# Patient Record
Sex: FEMALE | Race: BLACK OR AFRICAN AMERICAN | NOT HISPANIC OR LATINO | Employment: FULL TIME | URBAN - METROPOLITAN AREA
[De-identification: names, ages, dates, MRNs, and addresses within clinical notes are randomized per-mention and may not be internally consistent; named-entity substitution may affect disease eponyms.]

---

## 2021-06-16 PROBLEM — I10 BENIGN ESSENTIAL HYPERTENSION: Status: ACTIVE | Noted: 2019-04-12

## 2021-06-16 PROBLEM — D25.1 INTRAMURAL AND SUBSEROUS LEIOMYOMA OF UTERUS: Status: ACTIVE | Noted: 2018-05-22

## 2021-06-16 PROBLEM — D25.2 INTRAMURAL AND SUBSEROUS LEIOMYOMA OF UTERUS: Status: ACTIVE | Noted: 2018-05-22

## 2021-06-16 PROBLEM — R73.03 PREDIABETES: Status: ACTIVE | Noted: 2019-06-07

## 2021-06-16 PROBLEM — J06.9 VIRAL UPPER RESPIRATORY TRACT INFECTION: Status: ACTIVE | Noted: 2019-04-12

## 2021-06-16 PROBLEM — E87.6 LOW SERUM POTASSIUM: Status: ACTIVE | Noted: 2018-06-04

## 2021-06-16 PROBLEM — E23.6 RATHKE'S CYST (H): Status: ACTIVE | Noted: 2017-09-29

## 2022-09-08 ENCOUNTER — HOSPITAL ENCOUNTER (EMERGENCY)
Facility: HOSPITAL | Age: 47
Discharge: HOME | End: 2022-09-08
Attending: EMERGENCY MEDICINE | Admitting: EMERGENCY MEDICINE
Payer: COMMERCIAL

## 2022-09-08 ENCOUNTER — APPOINTMENT (OUTPATIENT)
Dept: RADIOLOGY | Facility: HOSPITAL | Age: 47
End: 2022-09-08
Attending: EMERGENCY MEDICINE
Payer: COMMERCIAL

## 2022-09-08 ENCOUNTER — APPOINTMENT (OUTPATIENT)
Dept: CT IMAGING | Facility: HOSPITAL | Age: 47
End: 2022-09-08
Attending: EMERGENCY MEDICINE
Payer: COMMERCIAL

## 2022-09-08 VITALS
HEIGHT: 67 IN | DIASTOLIC BLOOD PRESSURE: 64 MMHG | WEIGHT: 263.5 LBS | OXYGEN SATURATION: 99 % | RESPIRATION RATE: 16 BRPM | TEMPERATURE: 99.2 F | BODY MASS INDEX: 41.36 KG/M2 | HEART RATE: 80 BPM | SYSTOLIC BLOOD PRESSURE: 105 MMHG

## 2022-09-08 DIAGNOSIS — R05.9 COUGH: ICD-10-CM

## 2022-09-08 DIAGNOSIS — R68.83 CHILLS: ICD-10-CM

## 2022-09-08 LAB
ALBUMIN SERPL-MCNC: 3.6 G/DL (ref 3.5–5)
ALBUMIN UR-MCNC: 30 MG/DL
ALP SERPL-CCNC: 73 U/L (ref 45–120)
ALT SERPL W P-5'-P-CCNC: 19 U/L (ref 0–45)
ANION GAP SERPL CALCULATED.3IONS-SCNC: 11 MMOL/L (ref 5–18)
APPEARANCE UR: ABNORMAL
AST SERPL W P-5'-P-CCNC: 24 U/L (ref 0–40)
BASOPHILS # BLD AUTO: 0 10E3/UL (ref 0–0.2)
BASOPHILS NFR BLD AUTO: 0 %
BILIRUB DIRECT SERPL-MCNC: 0.2 MG/DL
BILIRUB SERPL-MCNC: 0.7 MG/DL (ref 0–1)
BILIRUB UR QL STRIP: NEGATIVE
BUN SERPL-MCNC: 17 MG/DL (ref 8–22)
CALCIUM SERPL-MCNC: 9.4 MG/DL (ref 8.5–10.5)
CHLORIDE BLD-SCNC: 96 MMOL/L (ref 98–107)
CO2 SERPL-SCNC: 31 MMOL/L (ref 22–31)
COLOR UR AUTO: YELLOW
CREAT SERPL-MCNC: 1.1 MG/DL (ref 0.6–1.1)
EOSINOPHIL # BLD AUTO: 0.1 10E3/UL (ref 0–0.7)
EOSINOPHIL NFR BLD AUTO: 1 %
ERYTHROCYTE [DISTWIDTH] IN BLOOD BY AUTOMATED COUNT: 12.8 % (ref 10–15)
FLUAV RNA SPEC QL NAA+PROBE: NEGATIVE
FLUBV RNA RESP QL NAA+PROBE: NEGATIVE
GFR SERPL CREATININE-BSD FRML MDRD: 61 ML/MIN/1.73M2
GLUCOSE BLD-MCNC: 207 MG/DL (ref 70–125)
GLUCOSE UR STRIP-MCNC: NEGATIVE MG/DL
HCT VFR BLD AUTO: 42.2 % (ref 35–47)
HGB BLD-MCNC: 14.1 G/DL (ref 11.7–15.7)
HGB UR QL STRIP: NEGATIVE
HYALINE CASTS: 1 /LPF
IMM GRANULOCYTES # BLD: 0.1 10E3/UL
IMM GRANULOCYTES NFR BLD: 1 %
KETONES UR STRIP-MCNC: ABNORMAL MG/DL
LEUKOCYTE ESTERASE UR QL STRIP: NEGATIVE
LYMPHOCYTES # BLD AUTO: 0.8 10E3/UL (ref 0.8–5.3)
LYMPHOCYTES NFR BLD AUTO: 5 %
MCH RBC QN AUTO: 30.6 PG (ref 26.5–33)
MCHC RBC AUTO-ENTMCNC: 33.4 G/DL (ref 31.5–36.5)
MCV RBC AUTO: 92 FL (ref 78–100)
MONOCYTES # BLD AUTO: 0.5 10E3/UL (ref 0–1.3)
MONOCYTES NFR BLD AUTO: 3 %
MUCOUS THREADS #/AREA URNS LPF: PRESENT /LPF
NEUTROPHILS # BLD AUTO: 15.5 10E3/UL (ref 1.6–8.3)
NEUTROPHILS NFR BLD AUTO: 90 %
NITRATE UR QL: NEGATIVE
NRBC # BLD AUTO: 0 10E3/UL
NRBC BLD AUTO-RTO: 0 /100
PH UR STRIP: 6 [PH] (ref 5–7)
PLATELET # BLD AUTO: 353 10E3/UL (ref 150–450)
POTASSIUM BLD-SCNC: 3.1 MMOL/L (ref 3.5–5)
PROT SERPL-MCNC: 7.3 G/DL (ref 6–8)
RBC # BLD AUTO: 4.61 10E6/UL (ref 3.8–5.2)
RBC URINE: 0 /HPF
RSV RNA SPEC NAA+PROBE: NEGATIVE
SARS-COV-2 RNA RESP QL NAA+PROBE: NEGATIVE
SODIUM SERPL-SCNC: 138 MMOL/L (ref 136–145)
SP GR UR STRIP: 1.04 (ref 1–1.03)
SQUAMOUS EPITHELIAL: 9 /HPF
UROBILINOGEN UR STRIP-MCNC: 2 MG/DL
WBC # BLD AUTO: 16.9 10E3/UL (ref 4–11)
WBC URINE: 2 /HPF

## 2022-09-08 PROCEDURE — 82248 BILIRUBIN DIRECT: CPT | Performed by: EMERGENCY MEDICINE

## 2022-09-08 PROCEDURE — 80053 COMPREHEN METABOLIC PANEL: CPT | Performed by: STUDENT IN AN ORGANIZED HEALTH CARE EDUCATION/TRAINING PROGRAM

## 2022-09-08 PROCEDURE — 87637 SARSCOV2&INF A&B&RSV AMP PRB: CPT | Performed by: EMERGENCY MEDICINE

## 2022-09-08 PROCEDURE — 258N000003 HC RX IP 258 OP 636: Performed by: STUDENT IN AN ORGANIZED HEALTH CARE EDUCATION/TRAINING PROGRAM

## 2022-09-08 PROCEDURE — C9803 HOPD COVID-19 SPEC COLLECT: HCPCS

## 2022-09-08 PROCEDURE — 250N000011 HC RX IP 250 OP 636: Performed by: EMERGENCY MEDICINE

## 2022-09-08 PROCEDURE — 74177 CT ABD & PELVIS W/CONTRAST: CPT

## 2022-09-08 PROCEDURE — 81001 URINALYSIS AUTO W/SCOPE: CPT | Performed by: EMERGENCY MEDICINE

## 2022-09-08 PROCEDURE — 250N000013 HC RX MED GY IP 250 OP 250 PS 637: Performed by: EMERGENCY MEDICINE

## 2022-09-08 PROCEDURE — 81001 URINALYSIS AUTO W/SCOPE: CPT | Performed by: STUDENT IN AN ORGANIZED HEALTH CARE EDUCATION/TRAINING PROGRAM

## 2022-09-08 PROCEDURE — 80048 BASIC METABOLIC PNL TOTAL CA: CPT | Performed by: EMERGENCY MEDICINE

## 2022-09-08 PROCEDURE — 87637 SARSCOV2&INF A&B&RSV AMP PRB: CPT | Performed by: STUDENT IN AN ORGANIZED HEALTH CARE EDUCATION/TRAINING PROGRAM

## 2022-09-08 PROCEDURE — 85025 COMPLETE CBC W/AUTO DIFF WBC: CPT | Performed by: EMERGENCY MEDICINE

## 2022-09-08 PROCEDURE — 99285 EMERGENCY DEPT VISIT HI MDM: CPT | Mod: CS,25

## 2022-09-08 PROCEDURE — 71045 X-RAY EXAM CHEST 1 VIEW: CPT

## 2022-09-08 PROCEDURE — 36415 COLL VENOUS BLD VENIPUNCTURE: CPT | Performed by: STUDENT IN AN ORGANIZED HEALTH CARE EDUCATION/TRAINING PROGRAM

## 2022-09-08 PROCEDURE — 85025 COMPLETE CBC W/AUTO DIFF WBC: CPT | Performed by: STUDENT IN AN ORGANIZED HEALTH CARE EDUCATION/TRAINING PROGRAM

## 2022-09-08 RX ORDER — POTASSIUM CHLORIDE 1500 MG/1
40 TABLET, EXTENDED RELEASE ORAL ONCE
Status: COMPLETED | OUTPATIENT
Start: 2022-09-08 | End: 2022-09-08

## 2022-09-08 RX ORDER — ACETAMINOPHEN 325 MG/1
975 TABLET ORAL ONCE
Status: COMPLETED | OUTPATIENT
Start: 2022-09-08 | End: 2022-09-08

## 2022-09-08 RX ORDER — IOPAMIDOL 755 MG/ML
100 INJECTION, SOLUTION INTRAVASCULAR ONCE
Status: COMPLETED | OUTPATIENT
Start: 2022-09-08 | End: 2022-09-08

## 2022-09-08 RX ADMIN — POTASSIUM CHLORIDE 40 MEQ: 20 TABLET, EXTENDED RELEASE ORAL at 21:33

## 2022-09-08 RX ADMIN — ACETAMINOPHEN 975 MG: 325 TABLET ORAL at 21:32

## 2022-09-08 RX ADMIN — IOPAMIDOL 100 ML: 755 INJECTION, SOLUTION INTRAVENOUS at 21:47

## 2022-09-08 RX ADMIN — SODIUM CHLORIDE 1000 ML: 9 INJECTION, SOLUTION INTRAVENOUS at 17:54

## 2022-09-08 ASSESSMENT — ENCOUNTER SYMPTOMS
CONFUSION: 0
FEVER: 0
CHILLS: 1
DIZZINESS: 0
VOMITING: 0
NAUSEA: 0
SORE THROAT: 0
JOINT SWELLING: 0
SHORTNESS OF BREATH: 0
DYSURIA: 0
ABDOMINAL PAIN: 1
COUGH: 1
HEMATURIA: 0
DIARRHEA: 0

## 2022-09-08 ASSESSMENT — ACTIVITIES OF DAILY LIVING (ADL): ADLS_ACUITY_SCORE: 33

## 2022-09-08 NOTE — Clinical Note
Bjorn Doshi was seen and treated in our emergency department on 9/8/2022.  She may return to work on 09/11/2022.       If you have any questions or concerns, please don't hesitate to call.      José Luis Dykes MD

## 2022-09-08 NOTE — ED TRIAGE NOTES
Patient comes from home with spouse. After a training today, felt cold and had shivering in a addition to kasia abdominal pain. Patient has expressed flu like symptoms and was in bed before coming in.      Triage Assessment     Row Name 09/08/22 3208       Triage Assessment (Adult)    Airway WDL WDL

## 2022-09-09 NOTE — ED NOTES
"Assisting primary RN...    Pt endorsing the IV fluids helped make her feel better. Pain currently 7/10 medial below umbilicus.     Pt updated on lab work and plan for CT scan. Pt questioned about HIV and relation of HIV to her lab work. Pt otherwise endorses she does not have concerns regarding HIV, was just asking.    Also endorsing \"I was supposed to get gallstones removed approx.12 years ago\" was able to control w/ diet.    Also endorsing has fibroids hx, and that she was throwing up all last week and did not eat much and fatigue.  "

## 2022-09-09 NOTE — ED PROVIDER NOTES
"  Emergency Department Encounter     Evaluation Date & Time:   9/8/2022  8:55 PM    CHIEF COMPLAINT:  Abdominal Pain and Flu Symptoms      Triage Note:  Patient comes from home with spouse. After a training today, felt cold and had shivering in a addition to some abdominal pain. Patient has expressed flu like symptoms and was in bed before coming in.              ED COURSE & MEDICAL DECISION MAKING:     ED Course as of 09/08/22 2232   Thu Sep 08, 2022 2110 K 3.1, glucose 207.  WBC 16.9.  Rest of labs unremarkable including neg flu/covid.   2151 CXR (independent interpretation): no acute cardiopulmonary process    2206 CT showing fibroids, which pt knows about.  Cholelithiasis reported, but pt has no RUQ/epigastric pain to suggest acute hepatobiliary process.   2221 Pt re-evaluated, updated.  Will discharge. Encouraged outpatient primary clinic follow up, return precautions.       Pt here with a couple hours of shivering while at work tonight.  Pt reports some lower abdominal pain, primarily with coughing.  Pt reports a \"cold\" last week, but has a continued, mild cough.  Denies n/v/d or other acute symptoms.  She has a temp of 100.4 on initial triage vitals.  Abdomen is rather benign, but given leukocytosis and fever/chills, will get CT abd/pelvis, as well as CXR to rule out PNA.      At the conclusion of the encounter I discussed the results of all the tests and the disposition. The questions were answered. The patient or family acknowledged understanding and was agreeable with the care plan.      MEDICATIONS GIVEN IN THE EMERGENCY DEPARTMENT:  Medications   0.9% sodium chloride BOLUS (0 mLs Intravenous Stopped 9/8/22 1953)   acetaminophen (TYLENOL) tablet 975 mg (975 mg Oral Given 9/8/22 2132)   potassium chloride ER (KLOR-CON M) CR tablet 40 mEq (40 mEq Oral Given 9/8/22 2133)   iopamidol (ISOVUE-370) solution 100 mL (100 mLs Intravenous Given 9/8/22 2147)       NEW PRESCRIPTIONS STARTED AT TODAY'S ED " "VISIT:  New Prescriptions    No medications on file       HPI   The patient was at work earlier today when she felt \"shivery\" and had chills. She went home and rested under blankets until her chills went away. She also complains of a cough and lower abdominal pain when she coughs, she notes that IV pain meds helped her pain. Her first temperature was 100.4, but has gone down to 99.2 while in the ED. The patient has not taken any medication for her symptoms. Patient denies nausea, vomiting, urinary changes, diarrhea, bowel changes, rash, or any other symptoms or complaints.     The history is provided by the patient. No  was used.        Bjorn Doshi is a 47 year old female with a pertinent history of morbid obesity, acute kidney injury, HTN, and tobacco abuse who presents to this ED via walk-in for evaluation of flu symptoms     REVIEW OF SYSTEMS:  Review of Systems   Constitutional: Positive for chills. Negative for fever.   HENT: Negative for sore throat.    Eyes: Negative for visual disturbance.   Respiratory: Positive for cough. Negative for shortness of breath.    Cardiovascular: Negative for chest pain.   Gastrointestinal: Positive for abdominal pain (lower ). Negative for diarrhea, nausea and vomiting.   Endocrine: Negative for polyuria.   Genitourinary: Negative for dysuria and hematuria.        - urinary changes     Musculoskeletal: Negative for joint swelling.   Skin: Negative for rash.   Neurological: Negative for dizziness.   Psychiatric/Behavioral: Negative for confusion.   All other systems reviewed and are negative.          Medical History     Past Medical History:   Diagnosis Date     Chest pain      FHx: heart disease      Gallstones      Gastric ulcer      GDM (gestational diabetes mellitus) 6/10/2010     GERD (gastroesophageal reflux disease)      Hypertension      Pneumonia      Tobacco abuse      UTI (lower urinary tract infection)        No past surgical history on " "file.    Family History   Problem Relation Age of Onset     Heart Disease Mother      Heart Disease Father        Social History     Tobacco Use     Smoking status: Current Some Day Smoker     Packs/day: 0.25   Substance Use Topics     Alcohol use: Yes     Comment: Alcoholic Drinks/day: heavy on weekend only- 12 beers/week     Drug use: No       amLODIPine (NORVASC) 2.5 MG tablet  lisinopril (PRINIVIL,ZESTRIL) 5 MG tablet        Physical Exam     Vitals:  /71   Pulse 105   Temp 99.2  F (37.3  C) (Oral)   Resp 16   Ht 1.702 m (5' 7\")   Wt 119.5 kg (263 lb 8 oz)   SpO2 95%   BMI 41.27 kg/m      PHYSICAL EXAM:   Physical Exam  Vitals and nursing note reviewed.   Constitutional:       General: She is not in acute distress.     Appearance: Normal appearance.   HENT:      Head: Normocephalic and atraumatic.      Nose: Nose normal.      Mouth/Throat:      Mouth: Mucous membranes are moist.   Eyes:      Pupils: Pupils are equal, round, and reactive to light.   Cardiovascular:      Rate and Rhythm: Normal rate and regular rhythm.      Pulses: Normal pulses.           Radial pulses are 2+ on the right side and 2+ on the left side.        Dorsalis pedis pulses are 2+ on the right side and 2+ on the left side.   Pulmonary:      Effort: Pulmonary effort is normal. No respiratory distress.      Breath sounds: Normal breath sounds.   Abdominal:      Palpations: Abdomen is soft.      Tenderness: There is abdominal tenderness. Negative signs include Gibson's sign, Rovsing's sign and McBurney's sign.      Comments: Minimal low abdominal tenderness   No CVA tenderness    Musculoskeletal:      Cervical back: Full passive range of motion without pain and neck supple.      Comments: No calf tenderness or swelling b/l   Skin:     General: Skin is warm.      Findings: No rash.   Neurological:      General: No focal deficit present.      Mental Status: She is alert. Mental status is at baseline.      Comments: Fluent speech, no " acute lateralizing deficits   Psychiatric:         Mood and Affect: Mood normal.         Behavior: Behavior normal.         Results     LAB:  All pertinent labs reviewed and interpreted  Labs Ordered and Resulted from Time of ED Arrival to Time of ED Departure   ROUTINE UA WITH MICROSCOPIC REFLEX TO CULTURE - Abnormal       Result Value    Color Urine Yellow      Appearance Urine Turbid (*)     Glucose Urine Negative      Bilirubin Urine Negative      Ketones Urine Trace (*)     Specific Gravity Urine 1.036 (*)     Blood Urine Negative      pH Urine 6.0      Protein Albumin Urine 30  (*)     Urobilinogen Urine 2.0 (*)     Nitrite Urine Negative      Leukocyte Esterase Urine Negative      Mucus Urine Present (*)     RBC Urine 0      WBC Urine 2      Squamous Epithelials Urine 9 (*)     Hyaline Casts Urine 1     BASIC METABOLIC PANEL - Abnormal    Sodium 138      Potassium 3.1 (*)     Chloride 96 (*)     Carbon Dioxide (CO2) 31      Anion Gap 11      Urea Nitrogen 17      Creatinine 1.10      Calcium 9.4      Glucose 207 (*)     GFR Estimate 61     CBC WITH PLATELETS AND DIFFERENTIAL - Abnormal    WBC Count 16.9 (*)     RBC Count 4.61      Hemoglobin 14.1      Hematocrit 42.2      MCV 92      MCH 30.6      MCHC 33.4      RDW 12.8      Platelet Count 353      % Neutrophils 90      % Lymphocytes 5      % Monocytes 3      % Eosinophils 1      % Basophils 0      % Immature Granulocytes 1      NRBCs per 100 WBC 0      Absolute Neutrophils 15.5 (*)     Absolute Lymphocytes 0.8      Absolute Monocytes 0.5      Absolute Eosinophils 0.1      Absolute Basophils 0.0      Absolute Immature Granulocytes 0.1      Absolute NRBCs 0.0     INFLUENZA A/B & SARS-COV2 PCR MULTIPLEX - Normal    Influenza A PCR Negative      Influenza B PCR Negative      RSV PCR Negative      SARS CoV2 PCR Negative     HEPATIC FUNCTION PANEL - Normal    Bilirubin Total 0.7      Bilirubin Direct 0.2      Protein Total 7.3      Albumin 3.6      Alkaline  Phosphatase 73      AST 24      ALT 19         RADIOLOGY:  CT Abdomen Pelvis w Contrast   Final Result   IMPRESSION:    1.  Enlarged uterus with multiple masses presumed to reflect fibroids, though further evaluation with pelvic ultrasound may be of benefit.      2.  Cholelithiasis.      3.  Moderate-sized hiatal hernia.      XR Chest Port 1 View   Final Result   IMPRESSION: Stable chest with again seen small esophageal hiatal hernia. No acute cardiopulmonary abnormalities seen to explain patient's cough/fever clinically.                   ECG:  none    PROCEDURES:  Procedures:  none      FINAL IMPRESSION:    ICD-10-CM    1. Chills  R68.83    2. Cough  R05.9        0 minutes of critical care time      I, Florinda Fernando, am serving as a scribe to document services personally performed by Dr. José Luis Dykes, based on my observations and the provider's statements to me. I, José Luis Dykes, DO attest that Florinda Fernando is acting in a scribe capacity, has observed my performance of the services and has documented them in accordance with my direction.      José Luis Dykes DO  Emergency Medicine  [facility] EMERGENCY DEPARTMENT  9/8/2022  8:56 PM        José Luis Dykes MD  09/08/22 [id number]

## 2022-09-09 NOTE — DISCHARGE INSTRUCTIONS
Take Tylenol, ibuprofen for pain/fever as needed. Follow up with primary clinic. Return for worsening abdominal pain, uncontrolled vomiting or other changes/concerns.

## 2023-01-30 ENCOUNTER — APPOINTMENT (OUTPATIENT)
Dept: CT IMAGING | Facility: HOSPITAL | Age: 48
End: 2023-01-30
Attending: EMERGENCY MEDICINE
Payer: COMMERCIAL

## 2023-01-30 ENCOUNTER — HOSPITAL ENCOUNTER (EMERGENCY)
Facility: HOSPITAL | Age: 48
Discharge: HOME | End: 2023-01-30
Attending: EMERGENCY MEDICINE | Admitting: EMERGENCY MEDICINE
Payer: COMMERCIAL

## 2023-01-30 VITALS
RESPIRATION RATE: 17 BRPM | SYSTOLIC BLOOD PRESSURE: 140 MMHG | HEART RATE: 69 BPM | WEIGHT: 260 LBS | HEIGHT: 67 IN | BODY MASS INDEX: 40.81 KG/M2 | DIASTOLIC BLOOD PRESSURE: 83 MMHG | OXYGEN SATURATION: 100 % | TEMPERATURE: 98.5 F

## 2023-01-30 DIAGNOSIS — R10.84 DIFFUSE ABDOMINAL PAIN: ICD-10-CM

## 2023-01-30 DIAGNOSIS — E83.42 HYPOMAGNESEMIA: ICD-10-CM

## 2023-01-30 DIAGNOSIS — D25.9 UTERINE LEIOMYOMA, UNSPECIFIED LOCATION: ICD-10-CM

## 2023-01-30 DIAGNOSIS — K80.20 GALL BLADDER STONES: ICD-10-CM

## 2023-01-30 DIAGNOSIS — K44.9 HIATAL HERNIA: ICD-10-CM

## 2023-01-30 LAB
ALBUMIN SERPL BCG-MCNC: 3.7 G/DL (ref 3.5–5.2)
ALBUMIN UR-MCNC: 20 MG/DL
ALP SERPL-CCNC: 78 U/L (ref 35–104)
ALT SERPL W P-5'-P-CCNC: 14 U/L (ref 10–35)
ANION GAP SERPL CALCULATED.3IONS-SCNC: 10 MMOL/L (ref 7–15)
APPEARANCE UR: ABNORMAL
AST SERPL W P-5'-P-CCNC: 19 U/L (ref 10–35)
BACTERIA #/AREA URNS HPF: ABNORMAL /HPF
BASOPHILS # BLD AUTO: 0 10E3/UL (ref 0–0.2)
BASOPHILS NFR BLD AUTO: 0 %
BILIRUB DIRECT SERPL-MCNC: <0.2 MG/DL (ref 0–0.3)
BILIRUB SERPL-MCNC: 0.7 MG/DL
BILIRUB UR QL STRIP: NEGATIVE
BUN SERPL-MCNC: 11.9 MG/DL (ref 6–20)
CALCIUM SERPL-MCNC: 8.9 MG/DL (ref 8.6–10)
CHLORIDE SERPL-SCNC: 99 MMOL/L (ref 98–107)
COLOR UR AUTO: YELLOW
CREAT SERPL-MCNC: 0.75 MG/DL (ref 0.51–0.95)
DEPRECATED HCO3 PLAS-SCNC: 28 MMOL/L (ref 22–29)
EOSINOPHIL # BLD AUTO: 0.3 10E3/UL (ref 0–0.7)
EOSINOPHIL NFR BLD AUTO: 5 %
ERYTHROCYTE [DISTWIDTH] IN BLOOD BY AUTOMATED COUNT: 12.8 % (ref 10–15)
GFR SERPL CREATININE-BSD FRML MDRD: >90 ML/MIN/1.73M2
GLUCOSE SERPL-MCNC: 101 MG/DL (ref 70–99)
GLUCOSE UR STRIP-MCNC: NEGATIVE MG/DL
HCG SERPL QL: NEGATIVE
HCT VFR BLD AUTO: 44.2 % (ref 35–47)
HGB BLD-MCNC: 14.6 G/DL (ref 11.7–15.7)
HGB UR QL STRIP: NEGATIVE
HOLD SPECIMEN: NORMAL
HYALINE CASTS: 2 /LPF
IMM GRANULOCYTES # BLD: 0 10E3/UL
IMM GRANULOCYTES NFR BLD: 0 %
KETONES UR STRIP-MCNC: NEGATIVE MG/DL
LEUKOCYTE ESTERASE UR QL STRIP: ABNORMAL
LIPASE SERPL-CCNC: 23 U/L (ref 13–60)
LYMPHOCYTES # BLD AUTO: 0.6 10E3/UL (ref 0.8–5.3)
LYMPHOCYTES NFR BLD AUTO: 11 %
MAGNESIUM SERPL-MCNC: 1.6 MG/DL (ref 1.7–2.3)
MCH RBC QN AUTO: 30.9 PG (ref 26.5–33)
MCHC RBC AUTO-ENTMCNC: 33 G/DL (ref 31.5–36.5)
MCV RBC AUTO: 94 FL (ref 78–100)
MONOCYTES # BLD AUTO: 0.2 10E3/UL (ref 0–1.3)
MONOCYTES NFR BLD AUTO: 4 %
MUCOUS THREADS #/AREA URNS LPF: PRESENT /LPF
NEUTROPHILS # BLD AUTO: 4.6 10E3/UL (ref 1.6–8.3)
NEUTROPHILS NFR BLD AUTO: 80 %
NITRATE UR QL: NEGATIVE
NRBC # BLD AUTO: 0 10E3/UL
NRBC BLD AUTO-RTO: 0 /100
PH UR STRIP: 6 [PH] (ref 5–7)
PLATELET # BLD AUTO: 335 10E3/UL (ref 150–450)
POTASSIUM SERPL-SCNC: 3.7 MMOL/L (ref 3.4–5.3)
PROT SERPL-MCNC: 7 G/DL (ref 6.4–8.3)
RBC # BLD AUTO: 4.72 10E6/UL (ref 3.8–5.2)
RBC URINE: 1 /HPF
SODIUM SERPL-SCNC: 137 MMOL/L (ref 136–145)
SP GR UR STRIP: 1.03 (ref 1–1.03)
SQUAMOUS EPITHELIAL: 18 /HPF
UROBILINOGEN UR STRIP-MCNC: 2 MG/DL
WBC # BLD AUTO: 5.7 10E3/UL (ref 4–11)
WBC URINE: 3 /HPF

## 2023-01-30 PROCEDURE — 85025 COMPLETE CBC W/AUTO DIFF WBC: CPT | Performed by: EMERGENCY MEDICINE

## 2023-01-30 PROCEDURE — 83690 ASSAY OF LIPASE: CPT | Performed by: EMERGENCY MEDICINE

## 2023-01-30 PROCEDURE — 87086 URINE CULTURE/COLONY COUNT: CPT | Performed by: EMERGENCY MEDICINE

## 2023-01-30 PROCEDURE — 80053 COMPREHEN METABOLIC PANEL: CPT | Performed by: EMERGENCY MEDICINE

## 2023-01-30 PROCEDURE — 250N000011 HC RX IP 250 OP 636: Performed by: EMERGENCY MEDICINE

## 2023-01-30 PROCEDURE — 96365 THER/PROPH/DIAG IV INF INIT: CPT | Mod: 59

## 2023-01-30 PROCEDURE — 82248 BILIRUBIN DIRECT: CPT | Performed by: EMERGENCY MEDICINE

## 2023-01-30 PROCEDURE — 96361 HYDRATE IV INFUSION ADD-ON: CPT

## 2023-01-30 PROCEDURE — 81001 URINALYSIS AUTO W/SCOPE: CPT | Performed by: EMERGENCY MEDICINE

## 2023-01-30 PROCEDURE — 99285 EMERGENCY DEPT VISIT HI MDM: CPT | Mod: 25

## 2023-01-30 PROCEDURE — 74177 CT ABD & PELVIS W/CONTRAST: CPT

## 2023-01-30 PROCEDURE — 36415 COLL VENOUS BLD VENIPUNCTURE: CPT | Performed by: EMERGENCY MEDICINE

## 2023-01-30 PROCEDURE — 258N000003 HC RX IP 258 OP 636: Performed by: EMERGENCY MEDICINE

## 2023-01-30 PROCEDURE — 84703 CHORIONIC GONADOTROPIN ASSAY: CPT | Performed by: EMERGENCY MEDICINE

## 2023-01-30 PROCEDURE — 96375 TX/PRO/DX INJ NEW DRUG ADDON: CPT

## 2023-01-30 PROCEDURE — 83735 ASSAY OF MAGNESIUM: CPT | Performed by: EMERGENCY MEDICINE

## 2023-01-30 RX ORDER — ONDANSETRON 4 MG/1
4 TABLET, ORALLY DISINTEGRATING ORAL EVERY 8 HOURS PRN
Qty: 10 TABLET | Refills: 0 | Status: SHIPPED | OUTPATIENT
Start: 2023-01-30

## 2023-01-30 RX ORDER — ONDANSETRON 2 MG/ML
4 INJECTION INTRAMUSCULAR; INTRAVENOUS ONCE
Status: COMPLETED | OUTPATIENT
Start: 2023-01-30 | End: 2023-01-30

## 2023-01-30 RX ORDER — IOPAMIDOL 755 MG/ML
100 INJECTION, SOLUTION INTRAVASCULAR ONCE
Status: COMPLETED | OUTPATIENT
Start: 2023-01-30 | End: 2023-01-30

## 2023-01-30 RX ORDER — MAGNESIUM SULFATE HEPTAHYDRATE 40 MG/ML
2 INJECTION, SOLUTION INTRAVENOUS ONCE
Status: COMPLETED | OUTPATIENT
Start: 2023-01-30 | End: 2023-01-30

## 2023-01-30 RX ADMIN — IOPAMIDOL 100 ML: 755 INJECTION, SOLUTION INTRAVENOUS at 11:07

## 2023-01-30 RX ADMIN — MAGNESIUM SULFATE HEPTAHYDRATE 2 G: 40 INJECTION, SOLUTION INTRAVENOUS at 11:34

## 2023-01-30 RX ADMIN — ONDANSETRON 4 MG: 2 INJECTION INTRAMUSCULAR; INTRAVENOUS at 10:21

## 2023-01-30 RX ADMIN — SODIUM CHLORIDE 500 ML: 9 INJECTION, SOLUTION INTRAVENOUS at 10:11

## 2023-01-30 ASSESSMENT — ENCOUNTER SYMPTOMS
ABDOMINAL PAIN: 1
DYSURIA: 0
VOMITING: 1
SHORTNESS OF BREATH: 0
NAUSEA: 1
TROUBLE SWALLOWING: 0
DIARRHEA: 1
COUGH: 1

## 2023-01-30 NOTE — Clinical Note
Bjorn Doshi was seen and treated in our emergency department on 1/30/2023.  She may return to work on 02/01/2023.  Bjorn can return to the work sooner if she is feeling better.     If you have any questions or concerns, please don't hesitate to call.      Janessa Fink MD

## 2023-01-30 NOTE — DISCHARGE INSTRUCTIONS
I recommend stopping your potassium pills at this time.  I would recommend you continue taking your triamterene hydrochlorothiazide blood pressure medicine.    Touch base with your family doctor this week about your potassium pills.    Return emergency department with worsening abdominal pain, worsening vomiting or diarrhea, concerns for dehydration, or any other concerns.    The CT scan does show that you have a hiatal hernia as we discussed.  This would not be contributing to your symptoms.  You also have a follicle on your right ovary which usually suggest that you are midcycle in your menstrual cycle.  You also have gallbladder stones but I do not think they are causing your pain today.  The CT scan also shows fibroids of the uterus.    Thank you for choosing [facility].  It has been my pleasure caring for you today.     ~Dr. Aleks MD

## 2023-01-30 NOTE — ED PROVIDER NOTES
EMERGENCY DEPARTMENT ENCOUNTER      NAME: Bjorn Doshi  AGE: 47 year old female  YOB: 1975  MRN: [id number]  EVALUATION DATE & TIME: No admission date for patient encounter.    PCP: Anastasia Petty    ED PROVIDER: Janessa Fink M.D.        Chief Complaint   Patient presents with     Nausea, Vomiting, & Diarrhea     Abdominal Pain         FINAL IMPRESSION:    1. Diffuse abdominal pain    2. Uterine leiomyoma, unspecified location    3. Hiatal hernia    4. Gall bladder stones    5. Hypomagnesemia            MEDICAL DECISION MAKING:    Bjorn Doshi is a 47 year old female with history of prediabetes, accelerated hypertension, benign essential hypertension, [place], and tobacco abuse who presents to the ER with complaints of nausea, vomiting, and diarrhea.  CT imaging unremarkable for acute pathology.  Laboratories overall look good with just slightly low magnesium being repleted.  At this time I think it is safe for discharge home to follow-up with family doctor to discuss whether or not she needs to continue taking the potassium.  It is possible of the potassium itself is causing her GI upset.  I do not have concerns for surgical emergency.  No concerns for cardiac etiology to her symptoms.  I do not think this represents cholecystitis despite the findings of gallstones on imaging.    Will have her contact family doctor this week to discuss whether or not she should continue potassium and see if there is any other medication adjustments that might be beneficial.      ED COURSE:  9:44 I met with the patient in privacy to gather history and perform my exam. ED course and treatment discussed.    11:56 AM  I rechecked and updated the patient.  I went over all of her results, including incidental findings on CT scan.  I do not think her symptoms are being caused by hiatal hernia, fibroids, ovarian follicle, or gallbladder stones.  It is possible it could be related to the potassium that she is  taking and I encouraged her to stop the potassium at this time as her potassium is within normal limits.  I want her to touch base with family doctor this week to discuss whether or not she needs to continue that medication.  She has tolerated the blood pressure medication in the past and therefore I think it is safe for her to continue taking the blood pressure medication.  She does not appear toxic or septic.  Abdomen without rebound or guarding.  She feels comfortable with discharge home.  Plan to send her home with some Zofran.    I do not think that this represents ACS, PE, ruptured AAA, aortic dissection, bowel obstruction, bowel ischemia, cholecystitis, pancreatitis, appendicitis, diverticulitis, kidney stone, pyelonephritis, incarcerated or strangulated hernia, ovarian torsion, PID, ectopic pregnancy, tubo-ovarian abscess, viscus perforation, perforated GI ulcer, or other such etiologies at this time.      COVID-19 PPE worn during patient evaluation:  Mask: n95 and homemade masks   Eye Protection: goggles   Gown: none   Hair cover: yes  Face shield: none   Patient wearing a mask: yes     At the conclusion of the encounter I discussed the results of all of the tests and the disposition. Their questions were answered. The patient (and any family present) acknowledged understanding and were agreeable with the care plan.      CONSULTANTS:  none        MEDICATIONS GIVEN IN THE EMERGENCY:  Medications   magnesium sulfate 2 g in water intermittent infusion (2 g Intravenous New Bag 1/30/23 1134)   0.9% sodium chloride BOLUS (0 mLs Intravenous Stopped 1/30/23 1134)   ondansetron (ZOFRAN) injection 4 mg (4 mg Intravenous Given 1/30/23 1021)   iopamidol (ISOVUE-370) solution 100 mL (100 mLs Intravenous Given 1/30/23 1107)           NEW PRESCRIPTIONS STARTED AT TODAY'S ER VISIT     Medication List      Started    ondansetron 4 MG ODT tab  Commonly known as: ZOFRAN ODT  4 mg, Oral, EVERY 8 HOURS PRN       "          CONDITION:  stable        DISPOSITION:  discharge home with          =================================================================  =================================================================  TRIAGE ASSESSMENT:  Patient c/o nausea, vomitting, diarrhea and abdominal pain since Saturday .  Patient stated \" ever since I took my new BP meds and potassium pills \".        ED Triage Vitals [01/30/23 0913]   Enc Vitals Group      BP (!) 143/101      Pulse 74      Resp 16      Temp 98.5  F (36.9  C)      Temp src Oral      SpO2 99 %      Weight 117.9 kg (260 lb)      Height 1.702 m (5' 7\")          ================================================================  ================================================================    HPI    Patient information was obtained from: Patient    Use of Intrepreter: N/A     Bjorn Doshi is a 47 year old female with history of prediabetes, accelerated hypertension, benign essential hypertension, [place], and tobacco abuse who presents to the ER with complaints of nausea, vomiting, and diarrhea.    Patient reports that she was recently prescribed potassium by her PCP and took her first dose on 1/28. She says that after taking the potassium she developed abdominal cramping, nausea, diarrhea, and dizziness. She also reports that she had an episode of emesis today. She also notes having a mild cough.     Patient denies fevers, chest pain, shortness of breath, or any other concerns at this time.    She is on triamterene/hydrochlorothiazide for blood pressure control in general.  She had run out of this medication and it was refilled by primary care.  She just started taking these 2 medications together on Saturday and her symptoms began.  She is wondering if this is a medication interaction.      REVIEW OF SYSTEMS  Review of Systems   HENT: Negative for trouble swallowing.    Respiratory: Positive for cough (ocassional). Negative for shortness of breath.  "   Cardiovascular: Negative for chest pain.   Gastrointestinal: Positive for abdominal pain, diarrhea, nausea and vomiting.   Genitourinary: Negative for dysuria.   Allergic/Immunologic: Negative for immunocompromised state.   All other systems reviewed and are negative.        PAST MEDICAL HISTORY:  Past Medical History:   Diagnosis Date     Chest pain      FHx: heart disease      Gallstones      Gastric ulcer      GDM (gestational diabetes mellitus) 6/10/2010     GERD (gastroesophageal reflux disease)      Hypertension      Pneumonia      Tobacco abuse      UTI (lower urinary tract infection)          PAST SURGICAL HISTORY:  History reviewed. No pertinent surgical history.      CURRENT MEDICATIONS:    Prior to Admission medications    Medication Sig Start Date End Date Taking? Authorizing Provider   amLODIPine (NORVASC) 2.5 MG tablet [AMLODIPINE (NORVASC) 2.5 MG TABLET] Take 1 tablet (2.5 mg total) by mouth daily. 6/7/16   Akhil SAEED MD   lisinopril (PRINIVIL,ZESTRIL) 5 MG tablet [LISINOPRIL (PRINIVIL,ZESTRIL) 5 MG TABLET] Take 1 tablet (5 mg total) by mouth daily. 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    PHYSICAL EXAM    Constitutional:  Well developed, Well nourished, NAD, GCS 15  HENT:  Normocephalic, Atraumatic, Bilateral external ears normal, Nose normal. Neck- Supple, No stridor.   Eyes:  PERRL, EOMI, Conjunctiva normal, No discharge.  Respiratory:  Normal breath sounds, No respiratory distress, No wheezing, Speaks full sentences easily. No cough.  Cardiovascular:  Normal heart rate, Regular rhythm, No rubs, No gallops. Chest wall nontender.  GI:  No excessive obesity.  Bowel sounds normal, Soft, mild diffuse tenderness, No masses, No flank tenderness. No rebound or guarding.   : deferred    Musculoskeletal: 2No cyanosis, No clubbing. Good range of motion in all major joints. No major deformities noted.   Integument:  Warm, Dry, No erythema, No rash.  No petechiae.   Neurologic:  Alert & oriented x 3, Normal motor function, Normal sensory function, No focal deficits noted. Normal gait.  Psychiatric:  Affect normal, Cooperative         LAB:  All pertinent labs reviewed and interpreted.  Recent Results (from the past 24 hour(s))   Basic metabolic panel    Collection Time: 01/30/23  9:42 AM   Result Value Ref Range    Sodium 137 136 - 145 mmol/L    Potassium 3.7 3.4 - 5.3 mmol/L    Chloride 99 98 - 107 mmol/L    Carbon Dioxide (CO2) 28 22 - 29 mmol/L    Anion Gap 10 7 - 15 mmol/L    Urea Nitrogen 11.9 6.0 - 20.0 mg/dL    Creatinine 0.75 0.51 - 0.95 mg/dL    Calcium 8.9 8.6 - 10.0 mg/dL    Glucose 101 (H) 70 - 99 mg/dL    GFR Estimate >90 >60 mL/min/1.73m2   Hepatic function panel    Collection Time: 01/30/23  9:42 AM   Result Value Ref Range    Protein Total 7.0 6.4 - 8.3 g/dL    Albumin 3.7 3.5 - 5.2 g/dL    Bilirubin Total 0.7 <=1.2 mg/dL    Alkaline " Phosphatase 78 35 - 104 U/L    AST 19 10 - 35 U/L    ALT 14 10 - 35 U/L    Bilirubin Direct <0.20 0.00 - 0.30 mg/dL   Lipase    Collection Time: 01/30/23  9:42 AM   Result Value Ref Range    Lipase 23 13 - 60 U/L   HCG QUALitative pregnancy (blood)    Collection Time: 01/30/23  9:42 AM   Result Value Ref Range    hCG Serum Qualitative Negative Negative   Extra Blue Top Tube    Collection Time: 01/30/23  9:42 AM   Result Value Ref Range    Hold Specimen JIC    Extra Green Top (Lithium Heparin) Tube    Collection Time: 01/30/23  9:42 AM   Result Value Ref Range    Hold Specimen JIC    Extra Purple Top Tube    Collection Time: 01/30/23  9:42 AM   Result Value Ref Range    Hold Specimen JIC    Extra Blood Bank Purple Top Tube    Collection Time: 01/30/23  9:42 AM   Result Value Ref Range    Hold Specimen JIC    CBC with platelets and differential    Collection Time: 01/30/23  9:42 AM   Result Value Ref Range    WBC Count 5.7 4.0 - 11.0 10e3/uL    RBC Count 4.72 3.80 - 5.20 10e6/uL    Hemoglobin 14.6 11.7 - 15.7 g/dL    Hematocrit 44.2 35.0 - 47.0 %    MCV 94 78 - 100 fL    MCH 30.9 26.5 - 33.0 pg    MCHC 33.0 31.5 - 36.5 g/dL    RDW 12.8 10.0 - 15.0 %    Platelet Count 335 150 - 450 10e3/uL    % Neutrophils 80 %    % Lymphocytes 11 %    % Monocytes 4 %    % Eosinophils 5 %    % Basophils 0 %    % Immature Granulocytes 0 %    NRBCs per 100 WBC 0 <1 /100    Absolute Neutrophils 4.6 1.6 - 8.3 10e3/uL    Absolute Lymphocytes 0.6 (L) 0.8 - 5.3 10e3/uL    Absolute Monocytes 0.2 0.0 - 1.3 10e3/uL    Absolute Eosinophils 0.3 0.0 - 0.7 10e3/uL    Absolute Basophils 0.0 0.0 - 0.2 10e3/uL    Absolute Immature Granulocytes 0.0 <=0.4 10e3/uL    Absolute NRBCs 0.0 10e3/uL   Magnesium    Collection Time: 01/30/23  9:42 AM   Result Value Ref Range    Magnesium 1.6 (L) 1.7 - 2.3 mg/dL   UA with Microscopic reflex to Culture    Collection Time: 01/30/23 10:10 AM    Specimen: Urine, Midstream   Result Value Ref Range    Color Urine Yellow  Colorless, Straw, Light Yellow, Yellow    Appearance Urine Turbid (A) Clear    Glucose Urine Negative Negative mg/dL    Bilirubin Urine Negative Negative    Ketones Urine Negative Negative mg/dL    Specific Gravity Urine 1.031 (H) 1.001 - 1.030    Blood Urine Negative Negative    pH Urine 6.0 5.0 - 7.0    Protein Albumin Urine 20 (A) Negative mg/dL    Urobilinogen Urine 2.0 (A) <2.0 mg/dL    Nitrite Urine Negative Negative    Leukocyte Esterase Urine 25 Willis/uL (A) Negative    Bacteria Urine Few (A) None Seen /HPF    Mucus Urine Present (A) None Seen /LPF    RBC Urine 1 <=2 /HPF    WBC Urine 3 <=5 /HPF    Squamous Epithelials Urine 18 (H) <=1 /HPF    Hyaline Casts Urine 2 <=2 /LPF       No results found for: ABORH        RADIOLOGY:  Reviewed all pertinent imaging. Please see official radiology report.    CT Abdomen Pelvis w Contrast   Final Result   IMPRESSION:    1.  Large fibromatous uterus similar to previous.      2.  Dominant 3 cm follicle right ovary with no free fluid. The should BE considered physiologic.      3.  Stable moderate sliding esophageal hiatal hernia.      4.  Incidental gallstones.            EKG:    none    PROCEDURES:  none    Medical Decision Making    History:    Supplemental history from: Documented in chart, if applicable    External Record(s) reviewed: Documented in chart, if applicable.    Work Up:    Chart documentation includes differential considered and any EKGs or imaging independently interpreted by provider, where specified.    In additional to work up documented, I considered the following work up: Documented in chart, if applicable.    External consultation:    Discussion of management with another provider: Documented in chart, if applicable    Complicating factors:    Care impacted by chronic illness: N/A    Care affected by social determinants of health: N/A    Disposition considerations: Discharge. I prescribed additional prescription strength medication(s) as charted.  N/A.              I, Marquez Naylor, am serving as a scribe to document services personally performed by Dr. Janessa Fink based on my observation and the provider's statements to me. I, Dr. Janessa Fink MD attest that Marquez Naylor is acting in a scribe capacity, has observed my performance of the services and has documented them in accordance with my direction.        Janessa Fink M.D. [facility]  Emergency Medicine and Medical Toxicology  [facility] EMERGENCY DEPARTMENT  [address] [place] [id number]  [phone]  Dept: [phone]             Janessa Fink MD  01/30/23 1212

## 2023-01-30 NOTE — ED TRIAGE NOTES
"Patient c/o nausea, vomitting, diarrhea and abdominal pain since Saturday .  Patient stated \" ever since I took my new BP meds and potassium pills \".       Triage Assessment     Row Name 01/30/23 0916       Triage Assessment (Adult)    Airway WDL WDL       Respiratory WDL    Respiratory WDL WDL       Skin Circulation/Temperature WDL    Skin Circulation/Temperature WDL WDL       Cardiac WDL    Cardiac WDL WDL       Peripheral/Neurovascular WDL    Peripheral Neurovascular WDL WDL       Cognitive/Neuro/Behavioral WDL    Cognitive/Neuro/Behavioral WDL WDL              "

## 2023-02-01 LAB — BACTERIA UR CULT: NORMAL
